# Patient Record
Sex: MALE | Race: WHITE | NOT HISPANIC OR LATINO
[De-identification: names, ages, dates, MRNs, and addresses within clinical notes are randomized per-mention and may not be internally consistent; named-entity substitution may affect disease eponyms.]

---

## 2019-10-03 PROBLEM — Z00.00 ENCOUNTER FOR PREVENTIVE HEALTH EXAMINATION: Status: ACTIVE | Noted: 2019-10-03

## 2019-12-03 ENCOUNTER — APPOINTMENT (OUTPATIENT)
Dept: HUMAN REPRODUCTION | Facility: CLINIC | Age: 54
End: 2019-12-03
Payer: COMMERCIAL

## 2019-12-03 PROCEDURE — 89322 SEMEN ANAL STRICT CRITERIA: CPT

## 2021-09-09 ENCOUNTER — NON-APPOINTMENT (OUTPATIENT)
Age: 56
End: 2021-09-09

## 2021-09-09 ENCOUNTER — APPOINTMENT (OUTPATIENT)
Dept: UROLOGY | Facility: CLINIC | Age: 56
End: 2021-09-09
Payer: COMMERCIAL

## 2021-09-09 VITALS
BODY MASS INDEX: 26.36 KG/M2 | TEMPERATURE: 98.1 F | DIASTOLIC BLOOD PRESSURE: 89 MMHG | SYSTOLIC BLOOD PRESSURE: 135 MMHG | HEART RATE: 82 BPM | HEIGHT: 69 IN | WEIGHT: 178 LBS

## 2021-09-09 PROCEDURE — 99204 OFFICE O/P NEW MOD 45 MIN: CPT

## 2021-09-09 RX ORDER — TADALAFIL 5 MG/1
5 TABLET, FILM COATED ORAL
Refills: 0 | Status: ACTIVE | COMMUNITY

## 2021-09-09 RX ORDER — BUPROPION HYDROCHLORIDE 300 MG/1
300 TABLET, EXTENDED RELEASE ORAL
Refills: 0 | Status: ACTIVE | COMMUNITY

## 2021-09-09 NOTE — PHYSICAL EXAM
[Urethral Meatus] : meatus normal [Penis Abnormality] : normal [facility]cised penis [facility] [Urinary Bladder Findings] : the bladder was normal on palpation [Scrotum] : the scrotum was normal [Epididymis] : the epididymides were normal [Testes Tenderness] : no tenderness of the testes [Testes Mass (___cm)] : there were no testicular masses [FreeTextEntry1] : grade II bilateral varicocele. 18cc  education anxious reviewed

## 2021-09-09 NOTE — ASSESSMENT
[FreeTextEntry1] : bilateral varicocele\par wife is 40\par We had an in depth conversation regarding the benefit of varicocelectomy today. He understands the literature which overwhelming reports a benefit in improvement in semen parameters. He also understands that there is limited data in terms of spontaneous pregnancy and take home baby rates.There is good data suggesting improved semen parameters which can possibly obviate the need for future IVF and has been shown to improve IVF outcomes in selected patients. We discussed the recent results of a [place] review which reported that one natural pregnancy is achieved for every 3-5 varicocele repairs over an unknown time frame. We also spoke about the fact that there is a 3 to 6 month delay before improvement is seen in semen parameters. The role of advancing maternal age was discussed in depth.\par \par Surgical risks, including a risk of infection, injury to the testicular artery (extremely rare), injury to the vas deferens and hydrocele were discussed, as was post operative pain and pain control. Post operative skin numbness in the anterior thigh/lateral scrotum were also discussed.\par he is hesitant to consider varicocelectomy given delay in care\par \par We spoke at length about the role of office based TESE with possible percutaneous epididymal sperm aspiration. Risks- bleeding, pain, infection, hypogonadism all discussed.  Risk of secondary epididymal obstruction also discussed. Procedure will be done under local anesthesia. He understands that sperm yields tend to be lower with this and that he may require future procedure for repeat sperm extraction if IVF attempts fail.\par \par will schedule at [facility]\par

## 2021-09-09 NOTE — HISTORY OF PRESENT ILLNESS
[FreeTextEntry1] : 55M on wellbutrin for depression, previously on propecia stopped x 3 motnhs\par  to Maria C Ernesto (40F)\par comes in with fialure to conceive x 3 years\par patient with 1 son 32 years old from previous marriage\par no female factor abnormality\par no ED\par no ejaculatory dysfucntion\par SA:\par told very low count and zero motility\par 0.4 ml/0.9 mil/ml/0% \par \par frzoen eggs avaialble

## 2021-09-09 NOTE — LETTER BODY
[Dear  ___] : Dear  [unfilled], [FreeTextEntry2] : Dennis Tan MD, PhD\par ATTN: LAB/Tre[facility] Rodriguez\par [facility]\par [address], [address]\[place] [address] [FreeTextEntry1] : I had the pleasure of seeing MORIAH GAN, a 55 year old man,  to your patient Maria C Gan, in the office in consultation today. Please see the attached note for full details.\par \par Thank you very much for allowing me to participate in the care of this patient. If you have any questions please feel free to call me at any time. \par \par \par Sincerely yours,\par \par \par \par Enio Zuluaga MD, ANTHONY\par Director, Male Fertility and Microsurgery\par  of Urology\par [facility]\par

## 2021-10-04 DIAGNOSIS — G89.18 OTHER ACUTE POSTPROCEDURAL PAIN: ICD-10-CM

## 2021-10-04 DIAGNOSIS — Z79.2 LONG TERM (CURRENT) USE OF ANTIBIOTICS: ICD-10-CM

## 2021-10-04 RX ORDER — CEPHALEXIN 500 MG/1
500 TABLET ORAL
Qty: 3 | Refills: 0 | Status: ACTIVE | COMMUNITY
Start: 2021-10-04 | End: 1900-01-01

## 2021-10-04 RX ORDER — ACETAMINOPHEN AND CODEINE 300; 30 MG/1; MG/1
300-30 TABLET ORAL
Qty: 10 | Refills: 0 | Status: ACTIVE | COMMUNITY
Start: 2021-10-04 | End: 1900-01-01

## 2021-10-05 ENCOUNTER — NON-APPOINTMENT (OUTPATIENT)
Age: 56
End: 2021-10-05

## 2021-10-06 ENCOUNTER — APPOINTMENT (OUTPATIENT)
Dept: UROLOGY | Facility: CLINIC | Age: 56
End: 2021-10-06
Payer: COMMERCIAL

## 2021-10-06 PROCEDURE — 54505 BIOPSY OF TESTIS: CPT | Mod: RT

## 2021-10-12 DIAGNOSIS — I86.1 SCROTAL VARICES: ICD-10-CM

## 2021-10-13 ENCOUNTER — NON-APPOINTMENT (OUTPATIENT)
Age: 56
End: 2021-10-13

## 2021-10-24 ENCOUNTER — TRANSCRIPTION ENCOUNTER (OUTPATIENT)
Age: 56
End: 2021-10-24

## 2021-10-25 ENCOUNTER — APPOINTMENT (OUTPATIENT)
Dept: UROLOGY | Facility: AMBULATORY SURGERY CENTER | Age: 56
End: 2021-10-25

## 2021-10-25 ENCOUNTER — OUTPATIENT (OUTPATIENT)
Dept: OUTPATIENT SERVICES | Facility: HOSPITAL | Age: 56
LOS: 1 days | Discharge: HOME | End: 2021-10-25
Payer: COMMERCIAL

## 2021-10-25 LAB — SARS-COV-2 RNA SPEC QL NAA+PROBE: NEGATIVE — SIGNIFICANT CHANGE UP

## 2021-10-25 PROCEDURE — 55535 REVISE SPERMATIC CORD VEINS: CPT | Mod: 50

## 2021-10-25 RX ORDER — DOCUSATE SODIUM 100 MG
1 CAPSULE ORAL
Qty: 14 | Refills: 0
Start: 2021-10-25 | End: 2021-10-31

## 2021-11-18 ENCOUNTER — APPOINTMENT (OUTPATIENT)
Dept: UROLOGY | Facility: CLINIC | Age: 56
End: 2021-11-18
Payer: COMMERCIAL

## 2021-11-18 VITALS — TEMPERATURE: 97.5 F | SYSTOLIC BLOOD PRESSURE: 118 MMHG | DIASTOLIC BLOOD PRESSURE: 83 MMHG | HEART RATE: 81 BPM

## 2021-11-18 PROCEDURE — 99024 POSTOP FOLLOW-UP VISIT: CPT

## 2021-11-18 NOTE — HISTORY OF PRESENT ILLNESS
[FreeTextEntry1] : Mr Ernesto Dunn is here for follow up after varicocelectomy that was performed on 10/25/2021\par he is feeling OK \par no pain \par no fever \par wounds are clean intact and dry bilaterally \par

## 2022-05-03 ENCOUNTER — APPOINTMENT (OUTPATIENT)
Dept: HUMAN REPRODUCTION | Facility: CLINIC | Age: 57
End: 2022-05-03

## 2022-06-07 ENCOUNTER — APPOINTMENT (OUTPATIENT)
Dept: HUMAN REPRODUCTION | Facility: CLINIC | Age: 57
End: 2022-06-07
Payer: COMMERCIAL

## 2022-06-07 PROCEDURE — 89322 SEMEN ANAL STRICT CRITERIA: CPT

## 2022-06-14 ENCOUNTER — TRANSCRIPTION ENCOUNTER (OUTPATIENT)
Age: 57
End: 2022-06-14

## 2022-07-11 ENCOUNTER — TRANSCRIPTION ENCOUNTER (OUTPATIENT)
Age: 57
End: 2022-07-11

## 2022-08-03 ENCOUNTER — NON-APPOINTMENT (OUTPATIENT)
Age: 57
End: 2022-08-03

## 2022-08-10 ENCOUNTER — TRANSCRIPTION ENCOUNTER (OUTPATIENT)
Age: 57
End: 2022-08-10

## 2022-08-10 ENCOUNTER — NON-APPOINTMENT (OUTPATIENT)
Age: 57
End: 2022-08-10

## 2022-08-19 ENCOUNTER — APPOINTMENT (OUTPATIENT)
Dept: UROLOGY | Facility: CLINIC | Age: 57
End: 2022-08-19

## 2022-08-19 VITALS — DIASTOLIC BLOOD PRESSURE: 71 MMHG | HEART RATE: 116 BPM | TEMPERATURE: 97.8 F | SYSTOLIC BLOOD PRESSURE: 120 MMHG

## 2022-08-19 PROCEDURE — 99214 OFFICE O/P EST MOD 30 MIN: CPT

## 2022-08-19 NOTE — HISTORY OF PRESENT ILLNESS
[FreeTextEntry1] : retruns for followup\par s/p right TESE at [facility] no viable sperm noted\par underwent bilateral varicocelectomy ~ 1 year ago\par stopped propecia\par wife not currently undergoing stim\par +frozen eggs\par preop 0 motility\par 7 nonmotile sperm on most recent SA\par FSH 5.4\par E2 33\par \par LH 5.6\par

## 2022-08-19 NOTE — ASSESSMENT
[FreeTextEntry1] : hypogoandism\par for extended sperm search at [facility]\par will dc EtOH use\par if fails- \par reviewed role TESE vs mTESE\par if extended SA fails consider hold EtOH 3-6 months and repeat (frozen eggs)

## 2023-06-06 ENCOUNTER — APPOINTMENT (OUTPATIENT)
Dept: HUMAN REPRODUCTION | Facility: CLINIC | Age: 58
End: 2023-06-06

## 2023-07-24 ENCOUNTER — APPOINTMENT (OUTPATIENT)
Dept: HUMAN REPRODUCTION | Facility: CLINIC | Age: 58
End: 2023-07-24
Payer: COMMERCIAL

## 2023-07-24 PROCEDURE — 89322 SEMEN ANAL STRICT CRITERIA: CPT

## 2023-08-07 ENCOUNTER — TRANSCRIPTION ENCOUNTER (OUTPATIENT)
Age: 58
End: 2023-08-07

## 2023-08-07 ENCOUNTER — NON-APPOINTMENT (OUTPATIENT)
Age: 58
End: 2023-08-07